# Patient Record
Sex: MALE | Race: WHITE
[De-identification: names, ages, dates, MRNs, and addresses within clinical notes are randomized per-mention and may not be internally consistent; named-entity substitution may affect disease eponyms.]

---

## 2018-10-25 ENCOUNTER — HOSPITAL ENCOUNTER (OUTPATIENT)
Dept: HOSPITAL 61 - KCIC | Age: 48
Discharge: HOME | End: 2018-10-25
Attending: NURSE PRACTITIONER
Payer: COMMERCIAL

## 2018-10-25 DIAGNOSIS — I70.0: Primary | ICD-10-CM

## 2018-10-25 PROCEDURE — 71046 X-RAY EXAM CHEST 2 VIEWS: CPT

## 2018-10-26 NOTE — [facility]
CHEST PA   LATERAL

 

History: Chronic cough, dyspnea on exertion and aspiration into airway. 

 

Comparison: None.

 

Findings: 

Atherosclerotic aortic arch. The cardiomediastinal silhouette is normal. 

Pulmonary vasculature is normal. The lungs are clear. No pleural effusion 

or pneumothorax is seen. There is no acute bone abnormality. 

 

IMPRESSION: 

No acute cardiopulmonary process. 

 

 

Electronically signed by: Thad Grimes MD (10/26/2018 10:10 AM) [id number]

## 2018-12-28 ENCOUNTER — HOSPITAL ENCOUNTER (OUTPATIENT)
Dept: HOSPITAL 61 - KCIC US | Age: 48
Discharge: HOME | End: 2018-12-28
Attending: FAMILY MEDICINE
Payer: COMMERCIAL

## 2018-12-28 DIAGNOSIS — M79.89: ICD-10-CM

## 2018-12-28 DIAGNOSIS — I73.9: Primary | ICD-10-CM

## 2018-12-28 PROCEDURE — 93925 LOWER EXTREMITY STUDY: CPT

## 2018-12-28 NOTE — [facility]
Bilateral lower extremity arterial Doppler 12/20/2018

 

INDICATION: Peripheral vascular disease.

 

COMPARISON: None available

 

TECHNIQUE: Sonographic evaluation of bilateral lower extremity arterial 

system was performed utilizing grayscale and color Doppler.

 

FINDINGS:

 

Right lower extremity peak systolic velocity (centimeters per second):

Common femoral artery: 143, triphasic

Deep femoral artery: 93, triphasic

Superficial femoral artery, proximal: 110, triphasic

Superficial femoral artery, mid: 98, triphasic

Superficial femoral artery, distal: 89, triphasic

Popliteal: 94, triphasic

Posterior tibialis artery: 94, triphasic

Peroneal artery: 53, biphasic

Anterior tibial artery: 42, biphasic

Dorsalis pedis artery: 38, biphasic

 

There is a 3.4 x 1.0 x 2.5 cm simple appearing popliteal cyst on the 

right.

 

Left lower extremity peak systolic velocity (centimeters per second):

Common femoral artery: 152, triphasic

Deep femoral artery: 58, triphasic

Superficial femoral artery, proximal: 108, triphasic

Superficial femoral artery, mid: 94, triphasic

Superficial femoral artery, distal: 93, triphasic

Popliteal: 104, triphasic

Posterior tibialis artery: 82, triphasic

Peroneal artery: 54, triphasic

Anterior tibial artery: 68, biphasic

Dorsalis pedis artery: 25, biphasic

 

IMPRESSION:

 

No significant flow-limiting stenosis is identified at the bilateral lower

extremity arterial system.

 

Electronically signed by: Celeste Ballesteros MD (12/28/2018 1:14 PM) 

[id number]

## 2019-01-09 ENCOUNTER — HOSPITAL ENCOUNTER (OUTPATIENT)
Dept: HOSPITAL 61 - KCIC US | Age: 49
Discharge: HOME | End: 2019-01-09
Attending: FAMILY MEDICINE
Payer: COMMERCIAL

## 2019-01-09 DIAGNOSIS — I82.422: Primary | ICD-10-CM

## 2019-01-09 PROCEDURE — 93971 EXTREMITY STUDY: CPT

## 2019-01-09 NOTE — [facility]
EXAM: Left lower extremity venous Doppler sonogram.

 

HISTORY: DVT.

 

TECHNIQUE: Gray scale and color Doppler sonographic evaluation of the left

lower extremity veins with spectral waveform analysis was performed.

 

FINDINGS: There is normal color flow, normal compressibility and there are

normal spectral waveforms in the common femoral, superficial femoral, 

popliteal, posterior tibial and greater saphenous veins.

 

IMPRESSION: No Doppler evidence of lower extremity deep venous thrombosis.

 

Electronically signed by: Sindy Hood MD (1/9/2019 4:11 PM) [place] [id number]

## 2019-04-19 ENCOUNTER — HOSPITAL ENCOUNTER (OUTPATIENT)
Dept: HOSPITAL 61 - ECHO | Age: 49
Discharge: HOME | End: 2019-04-19
Attending: INTERNAL MEDICINE
Payer: COMMERCIAL

## 2019-04-19 DIAGNOSIS — R06.02: Primary | ICD-10-CM

## 2019-04-19 PROCEDURE — 93306 TTE W/DOPPLER COMPLETE: CPT

## 2019-04-21 NOTE — CARD
MR#: [id number]

Account#: [id number]

Accession#: [id number]

Date of Study: 04/19/2019

Ordering Physician: BANDAR JONES, 

Referring Physician: BANDAR JONES, 

Tech: Ailyn Vivas [facility]





--------------- APPROVED REPORT --------------





EXAM: Two-dimensional and M-mode echocardiogram with Doppler and color Doppler.



Other Information 

Quality : Technically LimitedHR: 85bpm

Rhythm : NSRTechnically limited study due to  body habitus.



INDICATION

Shortness of breath



2D DIMENSIONS 

RVDd3.3 (2.9-3.5cm)Left Atrium(2D)3.9 (1.6-4.0cm)

IVSd0.9 (0.7-1.1cm)Aortic Root(2D)3.0 (2.0-3.7cm)

LVDd5.1 (3.9-5.9cm)LVOT Diameter2.3 (1.8-2.4cm)

PWd0.9 (0.7-1.1cm)LVDs3.5 (2.5-4.0cm)

FS (%) 32.3 %SV75.3 ml

LVEF(%)60.2 (>50%)



M-Mode DIMENSIONS 

Left Atrium(MM)3.88 (2.5-4.0cm)Aortic Root3.29 (2.2-3.7cm)



Aortic Valve

AoV Peak Leon.115.4cm/sAoV VTI21.0cm

AO Peak GR.5.3mmHgLVOT Peak Leon.82.2cm/s

AO Mean GR.3mmHgAVA (VMAX)3.01cm2

GLORIA   (VTI)3.00cm2



Mitral Valve

MV E Chfwunfj68.1cm/sMV DECEL [id number]

MV A Wjvkwtqw34.3cm/sE/A  Ratio1.5

MV A [id number]



Pulmonary Valve

PV Peak Qgqkweyp077.9cm/s



 LEFT VENTRICLE 

The left ventricle is normal size. There is normal left ventricular wall thickness. The left ventricu
lar systolic function is normal and the ejection fraction is within normal range. The Ejection Fracti
on is 55-60%. There is normal LV segmental wall motion. Transmitral Doppler flow pattern is Grade II-
pseudonormal filling dynamics.



 RIGHT VENTRICLE 

The right ventricle is normal size. There is normal right ventricular wall thickness. The right ventr
icular systolic function is normal.



 ATRIA 

The left atrium is borderline dilated. The right atrium size is normal. The interatrial septum is int
act with no evidence for an atrial septal defect or patent foramen ovale as noted on 2-D or Doppler i
maging.



 AORTIC VALVE 

The aortic valve is normal in structure and function. The aortic valve is trileaflet. Doppler and Col
or Flow revealed no significant aortic regurgitation. There is no significant aortic valvular stenosi
s. There is no aortic valvular vegetation.



 MITRAL VALVE 

The mitral valve is normal in structure and function. There is no evidence of mitral valve prolapse. 
There is no mitral valve stenosis. Doppler and Color Flow revealed no mitral valve regurgitation note
d.



 TRICUSPID VALVE 

The tricuspid valve is normal in structure and function. Doppler and Color Flow revealed no tricuspid
 valve regurgitation noted. There is no tricuspid valve prolapse or vegetation. There is no tricuspid
 valve stenosis.



 PULMONIC VALVE 

The pulmonic valve is not well visualized.



 GREAT VESSELS 

The aortic root is normal in size. The ascending aorta is normal in size. The IVC is normal in size a
nd collapses >50% with inspiration.



 PERICARDIAL EFFUSION 

There is no evidence of significant pericardial effusion.



Critical Notification

Critical Value: No



<Conclusion>

The left ventricular systolic function is normal and the ejection fraction is within normal range. Th
e Ejection Fraction is 55-60%.

There is normal LV segmental wall motion.

No significant valvular disease. 

No significant pulmonary HTN

Technically difficult study. 



Signed by : Alireza Mike, 

Electronically Approved : 04/21/2019 11:41:40